# Patient Record
Sex: MALE | Race: WHITE | NOT HISPANIC OR LATINO | Employment: FULL TIME | URBAN - METROPOLITAN AREA
[De-identification: names, ages, dates, MRNs, and addresses within clinical notes are randomized per-mention and may not be internally consistent; named-entity substitution may affect disease eponyms.]

---

## 2017-01-31 ENCOUNTER — OFFICE VISIT (OUTPATIENT)
Dept: INTERNAL MEDICINE | Facility: CLINIC | Age: 67
End: 2017-01-31

## 2017-01-31 VITALS
WEIGHT: 218.8 LBS | BODY MASS INDEX: 32.41 KG/M2 | HEART RATE: 66 BPM | HEIGHT: 69 IN | SYSTOLIC BLOOD PRESSURE: 138 MMHG | TEMPERATURE: 97.4 F | DIASTOLIC BLOOD PRESSURE: 76 MMHG | RESPIRATION RATE: 16 BRPM

## 2017-01-31 DIAGNOSIS — E78.2 MIXED HYPERLIPIDEMIA: ICD-10-CM

## 2017-01-31 DIAGNOSIS — Z11.59 NEED FOR HEPATITIS C SCREENING TEST: ICD-10-CM

## 2017-01-31 DIAGNOSIS — Z00.00 ENCOUNTER FOR ANNUAL HEALTH EXAMINATION: Primary | ICD-10-CM

## 2017-01-31 DIAGNOSIS — N40.1 BENIGN NON-NODULAR PROSTATIC HYPERPLASIA WITH LOWER URINARY TRACT SYMPTOMS: ICD-10-CM

## 2017-01-31 DIAGNOSIS — I49.3 PVC'S (PREMATURE VENTRICULAR CONTRACTIONS): ICD-10-CM

## 2017-01-31 PROBLEM — E78.5 HYPERLIPIDEMIA: Status: ACTIVE | Noted: 2017-01-31

## 2017-01-31 PROBLEM — F32.A DEPRESSION: Status: ACTIVE | Noted: 2017-01-31

## 2017-01-31 PROBLEM — E78.00 HIGH CHOLESTEROL: Status: ACTIVE | Noted: 2017-01-31

## 2017-01-31 PROBLEM — R35.1 FREQUENT URINATION AT NIGHT: Status: ACTIVE | Noted: 2017-01-31

## 2017-01-31 PROBLEM — R00.2 PALPITATIONS: Status: ACTIVE | Noted: 2017-01-31

## 2017-01-31 PROCEDURE — G0008 ADMIN INFLUENZA VIRUS VAC: HCPCS | Performed by: INTERNAL MEDICINE

## 2017-01-31 PROCEDURE — 99204 OFFICE O/P NEW MOD 45 MIN: CPT | Performed by: INTERNAL MEDICINE

## 2017-01-31 RX ORDER — SILDENAFIL CITRATE 20 MG/1
20-100 TABLET ORAL DAILY PRN
Qty: 50 TABLET | Refills: 10 | Status: SHIPPED | OUTPATIENT
Start: 2017-01-31 | End: 2017-07-19

## 2017-01-31 RX ORDER — CHOLECALCIFEROL (VITAMIN D3) 125 MCG
500 CAPSULE ORAL DAILY
COMMUNITY
End: 2017-07-19

## 2017-01-31 NOTE — PROGRESS NOTES
Subjective   Alberto Sofia is a 66 y.o. male.     Chief Complaint   Patient presents with   • Palpitations     New Patient-Was recently in the ER for palpitations          Palpitations    This is a chronic problem. The current episode started more than 1 year ago. The problem occurs intermittently. The problem has been waxing and waning. On average, each episode lasts 1 second. Pertinent negatives include no anxiety, chest fullness, chest pain, coughing, diaphoresis, dizziness, shortness of breath or syncope. He has tried nothing for the symptoms. Risk factors include dyslipidemia, being male and family history.        The following portions of the patient's history were reviewed and updated as appropriate: allergies, current medications, past social history and problem list.    Outpatient Prescriptions Marked as Taking for the 1/31/17 encounter (Office Visit) with John Hines MD   Medication Sig Dispense Refill   • Multiple Vitamin (MULTI VITAMIN DAILY PO) Take  by mouth.     • vitamin B-12 (CYANOCOBALAMIN) 500 MCG tablet Take 500 mcg by mouth Daily.         Review of Systems   Constitutional: Negative for diaphoresis.   Respiratory: Negative for cough and shortness of breath.    Cardiovascular: Positive for palpitations. Negative for chest pain and syncope.   Neurological: Negative for dizziness.   Psychiatric/Behavioral: The patient is not nervous/anxious.        Objective   Vitals:    01/31/17 0925   BP: 138/76   Pulse: 66   Resp: 16   Temp: 97.4 °F (36.3 °C)      Last Weight    01/31/17  0925   Weight: 218 lb 12.8 oz (99.2 kg)    [unfilled]  Body mass index is 32.31 kg/(m^2).      Physical Exam   Constitutional: He appears well-developed and well-nourished. No distress.   HENT:   Head: Normocephalic and atraumatic.   Neck: Carotid bruit is not present. No thyromegaly present.   Cardiovascular: Normal rate, regular rhythm, normal heart sounds and intact distal pulses.  Exam reveals no gallop.    No  murmur heard.  Pulmonary/Chest: Effort normal and breath sounds normal. No respiratory distress. He has no wheezes. He has no rales.   Abdominal: Soft. Bowel sounds are normal. He exhibits no mass. There is no tenderness. There is no guarding.         Problem List Items Addressed This Visit        Cardiovascular and Mediastinum    PVC's (premature ventricular contractions)    Relevant Medications    sildenafil (REVATIO) 20 MG tablet    Hyperlipidemia    Relevant Orders    Lipid Panel       Genitourinary    Benign non-nodular prostatic hyperplasia with lower urinary tract symptoms    Relevant Orders    PSA      Other Visit Diagnoses     Encounter for annual health examination    -  Primary    Relevant Orders    CBC & Differential    Comprehensive Metabolic Panel    Need for hepatitis C screening test        Relevant Orders    Hepatitis C Antibody        Assessment/Plan   In with palpitations.  Was in the emergency room.  They just seem worse than usual.  He's had them for many years.  In the emergency room his exam was normal.  A 24 Holter was performed.  Had about 50 PVCs during that time as well as about 250 PACs.  These were all asymptomatic.  We discussed PVCs today.  Reassured.  No treatment is indicated.  He'll plan to schedule a physical at later date.  He does mention some problems with BPH and ED.  He's got a history of hyperlipidemia.  Only other coronary risk factor is a brother who had CAD in his 50s but was also a big-time smoker.  We'll begin sildenafil for his ED.  Schedule annual lab work today including CBC, CMP, lipids, PSA.  Schedule physical.  He knows Medicare does not cover physicals.  He is okay with that.  Flu shot and Prevnar today.         Dragon disclaimer:   Much of this encounter note is an electronic transcription/translation of spoken language to printed text. The electronic translation of spoken language may permit erroneous, or at times, nonsensical words or phrases to be  inadvertently transcribed; Although I have reviewed the note for such errors, some may still exist.

## 2017-01-31 NOTE — MR AVS SNAPSHOT
Alberto Sofia   1/31/2017 9:30 AM   Office Visit    Provider:  John Hines MD   Department:  [facility]   Dept Phone:  [phone]                Your Full Care Plan              Today's Medication Changes          These changes are accurate as of: 1/31/17 10:43 AM.  If you have any questions, ask your nurse or doctor.               New Medication(s)Ordered:     sildenafil 20 MG tablet   Commonly known as:  REVATIO   Take 1-5 tablets by mouth Daily As Needed (sex).   Started by:  John Hines MD            Where to Get Your Medications      You can get these medications from any pharmacy     Bring a paper prescription for each of these medications     sildenafil 20 MG tablet                  Your Updated Medication List          This list is accurate as of: 1/31/17 10:43 AM.  Always use your most recent med list.                MULTI VITAMIN DAILY PO       sildenafil 20 MG tablet   Commonly known as:  REVATIO   Take 1-5 tablets by mouth Daily As Needed (sex).       vitamin B-12 500 MCG tablet   Commonly known as:  CYANOCOBALAMIN               We Performed the Following     CBC & Differential     Comprehensive Metabolic Panel     Flu Vaccine Split Tri     Hepatitis C Antibody     Lipid Panel     PSA       You Were Diagnosed With        Codes Comments    Encounter for annual health examination    -  Primary ICD-10-CM: Z00.00  ICD-9-CM: V70.0     Mixed hyperlipidemia     ICD-10-CM: E78.2  ICD-9-CM: 272.2     PVC's (premature ventricular contractions)     ICD-10-CM: I49.3  ICD-9-CM: 427.69     Benign non-nodular prostatic hyperplasia with lower urinary tract symptoms     ICD-10-CM: N40.1  ICD-9-CM: 600.91     Need for hepatitis C screening test     ICD-10-CM: Z11.59  ICD-9-CM: V73.89       Instructions     None    Patient Instructions History      [employer] Signup     [facility] [employer] allows you to send messages to your doctor, view your test  "results, renew your prescriptions, schedule appointments, and more. To sign up, go to [email or web address] and click on the Sign Up Now link in the New User? box. Enter your [employer] Activation Code exactly as it appears below along with the last four digits of your Social Security Number and your Date of Birth () to complete the sign-up process. If you do not sign up before the expiration date, you must request a new code.    [employer] Activation Code: [email or web address]  Expires: 2017 10:40 AM    If you have questions, you can email [email or web address] or call [phone] to talk to our [employer] staff. Remember, [employer] is NOT to be used for urgent needs. For medical emergencies, dial 911.               Other Info from Your Visit           Your Appointments     Mar 17, 2017  9:30 AM EDT   Physical with John Hines MD   [facility] MEDICINE (--)    [address] [place] [id number] [phone]           Arrive 15 minutes prior to appointment.              Allergies     Iodine      Penicillins        Reason for Visit     Palpitations New Patient-Was recently in the ER for palpitations       Vital Signs     Blood Pressure Pulse Temperature Respirations Height Weight    138/76 (BP Location: Left arm, Patient Position: Sitting) 66 97.4 °F (36.3 °C) 16 69\" (175.3 cm) 218 lb 12.8 oz (99.2 kg)    Body Mass Index Smoking Status                32.31 kg/m2 Never Smoker          Problems and Diagnoses Noted     Benign non-nodular prostatic hyperplasia with lower urinary tract symptoms    Depression    High cholesterol or triglycerides    Irregular heartbeat    Encounter for annual health examination    -  Primary    Need for hepatitis C screening test          Immunizations Administered     Name Date    Influenza (IM) Preservative Free       "

## 2017-02-01 ENCOUNTER — TELEPHONE (OUTPATIENT)
Dept: INTERNAL MEDICINE | Facility: CLINIC | Age: 67
End: 2017-02-01

## 2017-02-01 LAB
ALBUMIN SERPL-MCNC: 5.2 G/DL (ref 3.5–5.2)
ALBUMIN/GLOB SERPL: 2.2 G/DL
ALP SERPL-CCNC: 90 U/L (ref 39–117)
ALT SERPL-CCNC: 33 U/L (ref 1–41)
AST SERPL-CCNC: 27 U/L (ref 1–40)
BASOPHILS # BLD AUTO: 0.03 10*3/MM3 (ref 0–0.2)
BASOPHILS NFR BLD AUTO: 0.6 % (ref 0–1.5)
BILIRUB SERPL-MCNC: 0.8 MG/DL (ref 0.1–1.2)
BUN SERPL-MCNC: 14 MG/DL (ref 8–23)
BUN/CREAT SERPL: 10.6 (ref 7–25)
CALCIUM SERPL-MCNC: 9.8 MG/DL (ref 8.6–10.5)
CHLORIDE SERPL-SCNC: 100 MMOL/L (ref 98–107)
CHOLEST SERPL-MCNC: 265 MG/DL (ref 0–200)
CO2 SERPL-SCNC: 26.3 MMOL/L (ref 22–29)
CREAT SERPL-MCNC: 1.32 MG/DL (ref 0.76–1.27)
EOSINOPHIL # BLD AUTO: 0.12 10*3/MM3 (ref 0–0.7)
EOSINOPHIL NFR BLD AUTO: 2.3 % (ref 0.3–6.2)
ERYTHROCYTE [DISTWIDTH] IN BLOOD BY AUTOMATED COUNT: 13.3 % (ref 11.5–14.5)
EST. AVERAGE GLUCOSE BLD GHB EST-MCNC: 121.48 MG/DL
GLOBULIN SER CALC-MCNC: 2.4 GM/DL
GLUCOSE SERPL-MCNC: 123 MG/DL (ref 65–99)
HBA1C MFR BLD: 5.86 % (ref 4.8–5.6)
HCT VFR BLD AUTO: 51.3 % (ref 40.4–52.2)
HCV AB S/CO SERPL IA: <0.1 S/CO RATIO (ref 0–0.9)
HDLC SERPL-MCNC: 39 MG/DL (ref 40–60)
HGB BLD-MCNC: 17.7 G/DL (ref 13.7–17.6)
IMM GRANULOCYTES # BLD: 0 10*3/MM3 (ref 0–0.03)
IMM GRANULOCYTES NFR BLD: 0 % (ref 0–0.5)
LDLC SERPL CALC-MCNC: ABNORMAL MG/DL
LYMPHOCYTES # BLD AUTO: 1.2 10*3/MM3 (ref 0.9–4.8)
LYMPHOCYTES NFR BLD AUTO: 23.1 % (ref 19.6–45.3)
Lab: NORMAL
MCH RBC QN AUTO: 32.4 PG (ref 27–32.7)
MCHC RBC AUTO-ENTMCNC: 34.5 G/DL (ref 32.6–36.4)
MCV RBC AUTO: 93.8 FL (ref 79.8–96.2)
MONOCYTES # BLD AUTO: 0.33 10*3/MM3 (ref 0.2–1.2)
MONOCYTES NFR BLD AUTO: 6.3 % (ref 5–12)
NEUTROPHILS # BLD AUTO: 3.52 10*3/MM3 (ref 1.9–8.1)
NEUTROPHILS NFR BLD AUTO: 67.7 % (ref 42.7–76)
PLATELET # BLD AUTO: 187 10*3/MM3 (ref 140–500)
POTASSIUM SERPL-SCNC: 4.7 MMOL/L (ref 3.5–5.2)
PROT SERPL-MCNC: 7.6 G/DL (ref 6–8.5)
PSA SERPL-MCNC: 2.16 NG/ML (ref 0–4)
RBC # BLD AUTO: 5.47 10*6/MM3 (ref 4.6–6)
SODIUM SERPL-SCNC: 141 MMOL/L (ref 136–145)
TRIGL SERPL-MCNC: 515 MG/DL (ref 0–150)
VLDLC SERPL CALC-MCNC: ABNORMAL MG/DL
WBC # BLD AUTO: 5.2 10*3/MM3 (ref 4.5–10.7)
WRITTEN AUTHORIZATION: NORMAL

## 2017-02-01 RX ORDER — ATORVASTATIN CALCIUM 40 MG/1
40 TABLET, FILM COATED ORAL DAILY
Qty: 90 TABLET | Refills: 1 | Status: SHIPPED | OUTPATIENT
Start: 2017-02-01 | End: 2017-12-21 | Stop reason: SDUPTHER

## 2017-02-01 NOTE — TELEPHONE ENCOUNTER
Patient would like a referral for a colonoscopy. Dr. Paige? Please advise.    Go [facility]head and make the referral.  Diagnosis routine screening colonoscopy.

## 2017-02-02 DIAGNOSIS — Z12.11 ENCOUNTER FOR SCREENING COLONOSCOPY: Primary | ICD-10-CM

## 2017-07-19 ENCOUNTER — TELEPHONE (OUTPATIENT)
Dept: INTERNAL MEDICINE | Facility: CLINIC | Age: 67
End: 2017-07-19

## 2017-07-19 ENCOUNTER — OFFICE VISIT (OUTPATIENT)
Dept: INTERNAL MEDICINE | Facility: CLINIC | Age: 67
End: 2017-07-19

## 2017-07-19 VITALS
RESPIRATION RATE: 16 BRPM | WEIGHT: 220.4 LBS | TEMPERATURE: 97.9 F | BODY MASS INDEX: 32.64 KG/M2 | HEART RATE: 60 BPM | SYSTOLIC BLOOD PRESSURE: 142 MMHG | OXYGEN SATURATION: 96 % | HEIGHT: 69 IN | DIASTOLIC BLOOD PRESSURE: 78 MMHG

## 2017-07-19 DIAGNOSIS — M54.5 RIGHT LOW BACK PAIN, UNSPECIFIED CHRONICITY, WITH SCIATICA PRESENCE UNSPECIFIED: Primary | ICD-10-CM

## 2017-07-19 LAB
BILIRUB BLD-MCNC: NEGATIVE MG/DL
CLARITY, POC: CLEAR
COLOR UR: YELLOW
GLUCOSE UR STRIP-MCNC: NEGATIVE MG/DL
KETONES UR QL: NEGATIVE
LEUKOCYTE EST, POC: NEGATIVE
NITRITE UR-MCNC: NEGATIVE MG/ML
PH UR: 5.5 [PH] (ref 5–8)
PROT UR STRIP-MCNC: ABNORMAL MG/DL
RBC # UR STRIP: NEGATIVE /UL
SP GR UR: 1.03 (ref 1–1.03)
UROBILINOGEN UR QL: NORMAL

## 2017-07-19 PROCEDURE — 81003 URINALYSIS AUTO W/O SCOPE: CPT | Performed by: INTERNAL MEDICINE

## 2017-07-19 PROCEDURE — 99213 OFFICE O/P EST LOW 20 MIN: CPT | Performed by: INTERNAL MEDICINE

## 2017-07-19 PROCEDURE — G0439 PPPS, SUBSEQ VISIT: HCPCS | Performed by: INTERNAL MEDICINE

## 2017-07-19 RX ORDER — PREDNISONE 1 MG/1
TABLET ORAL
Qty: 42 TABLET | Refills: 0 | Status: SHIPPED | OUTPATIENT
Start: 2017-07-19 | End: 2018-01-05

## 2017-07-19 RX ORDER — METHYLPREDNISOLONE 4 MG/1
TABLET ORAL
Qty: 1 EACH | Refills: 0 | Status: SHIPPED | OUTPATIENT
Start: 2017-07-19 | End: 2017-07-19

## 2017-07-19 NOTE — PROGRESS NOTES
Subjective   Alberto Sofia is a 67 y.o. male.     Chief Complaint   Patient presents with   • Back Pain         Back Pain   This is a new problem. The current episode started 1 to 4 weeks ago. The problem occurs constantly. The problem has been gradually worsening since onset. The quality of the pain is described as cramping. The pain does not radiate. The pain is severe. The symptoms are aggravated by bending and twisting. Pertinent negatives include no bladder incontinence, bowel incontinence, fever, numbness, paresis, paresthesias, tingling or weakness. He has tried nothing for the symptoms.        The following portions of the patient's history were reviewed and updated as appropriate: allergies, current medications, past social history and problem list.    Outpatient Prescriptions Marked as Taking for the 7/19/17 encounter (Office Visit) with John Hines MD   Medication Sig Dispense Refill   • atorvastatin (LIPITOR) 40 MG tablet Take 1 tablet by mouth Daily. 90 tablet 1   • Multiple Vitamin (MULTI VITAMIN DAILY PO) Take  by mouth.         Review of Systems   Constitutional: Negative for chills and fever.   Gastrointestinal: Negative for bowel incontinence.   Genitourinary: Negative for bladder incontinence and difficulty urinating.   Musculoskeletal: Positive for back pain.   Neurological: Negative for tingling, weakness, numbness and paresthesias.       Objective   Vitals:    07/19/17 1156   BP: 142/78   Pulse: 60   Resp: 16   Temp: 97.9 °F (36.6 °C)   SpO2: 96%      Last Weight    07/19/17  1156   Weight: 220 lb 6.4 oz (100 kg)    [unfilled]  Body mass index is 32.55 kg/(m^2).      Physical Exam   Constitutional: He is oriented to person, place, and time. He appears well-developed and well-nourished.   HENT:   Head: Normocephalic and atraumatic.   Neurological: He is alert and oriented to person, place, and time. He has normal strength and normal reflexes. He exhibits normal muscle tone.          Problem List Items Addressed This Visit     None      Visit Diagnoses     Right low back pain, unspecified chronicity, with sciatica presence unspecified    -  Primary    Relevant Orders    POCT urinalysis dipstick, automated (Completed)        Assessment/Plan   In with low back pain for over one week now.  It's a crampy pain.  It's about the level of L1.  He had one spell of pain in the midepigastric region that was brief and has not returned.  Is no radiation down the legs.  He's had a history of bulging disc in back and lower back.  A history of kidney stones.  The pain is worse with twisting and turning and movement.  It does not feel like a kidney stone to him nor does it sound like one to me.  This sounds like lumbar disc disease.  We'll treat with a Medrol Dosepak today.  Heat.  Rest.  Advil when necessary.  Urinalysis is normal today.  Annual wellness exam today.         Dragon disclaimer:   Much of this encounter note is an electronic transcription/translation of spoken language to printed text. The electronic translation of spoken language may permit erroneous, or at times, nonsensical words or phrases to be inadvertently transcribed; Although I have reviewed the note for such errors, some may still exist.

## 2017-07-19 NOTE — PROGRESS NOTES
QUICK REFERENCE INFORMATION:  The ABCs of the Annual Wellness Visit    Subsequent Medicare Wellness Visit    HEALTH RISK ASSESSMENT    1950    Recent Hospitalizations:  No hospitalization(s) within the last year..        Current Medical Providers:  Patient Care Team:  John Hines MD as PCP - Internal Medicine (Internal Medicine)  John Hines MD as PCP - Claims Attributed        Smoking Status:  History   Smoking Status   • Never Smoker   Smokeless Tobacco   • Not on file       Alcohol Consumption:  History   Alcohol Use No       Depression Screen:   PHQ-9 Depression Screening 7/19/2017   Little interest or pleasure in doing things 1   Feeling down, depressed, or hopeless 1   Trouble falling or staying asleep, or sleeping too much 0   Feeling tired or having little energy 1   Poor appetite or overeating 0   Feeling bad about yourself - or that you are a failure or have let yourself or your family down 1   Trouble concentrating on things, such as reading the newspaper or watching television 0   Moving or speaking so slowly that other people could have noticed. Or the opposite - being so fidgety or restless that you have been moving around a lot more than usual 0   Thoughts that you would be better off dead, or of hurting yourself in some way 0   PHQ-9 Total Score 4   If you checked off any problems, how difficult have these problems made it for you to do your work, take care of things at home, or get along with other people? Not difficult at all       Health Habits and Functional and Cognitive Screening:  Functional & Cognitive Status 7/19/2017   Do you have difficulty preparing food and eating? No   Do you have difficulty bathing yourself? No   Do you have difficulty getting dressed? No   Do you have difficulty using the toilet? No   Do you have difficulty moving around from place to place? No   In the past year have you fallen or experienced a near fall? No   Do you need help using the phone?  No   Are  you deaf or do you have serious difficulty hearing?  No   Do you need help with transportation? No   Do you need help shopping? No   Do you need help preparing meals?  No   Do you need help with housework?  No   Do you need help with laundry? No   Do you need help taking your medications? No   Do you need help managing money? No   Do you have difficulty concentrating, remembering or making decisions? No       Health Habits  Current Diet: Unhealthy Diet  Dental Exam: Up to date  Eye Exam: Up to date  Exercise (times per week): 4 times per week  Current Exercise Activities Include: Running/Jogging      Does the patient have evidence of cognitive impairment? No    Aspirin use counseling: Does not need ASA (and currently is not on it)      Recent Lab Results:  CMP:  Lab Results   Component Value Date     (H) 01/31/2017    BUN 14 01/31/2017    CREATININE 1.32 (H) 01/31/2017    EGFRIFNONA 54 (L) 01/31/2017    EGFRIFAFRI 66 01/31/2017    BCR 10.6 01/31/2017     01/31/2017    K 4.7 01/31/2017    CO2 26.3 01/31/2017    CALCIUM 9.8 01/31/2017    PROTENTOTREF 7.6 01/31/2017    ALBUMIN 5.20 01/31/2017    LABGLOBREF 2.4 01/31/2017    LABIL2 2.2 01/31/2017    BILITOT 0.8 01/31/2017    ALKPHOS 90 01/31/2017    AST 27 01/31/2017    ALT 33 01/31/2017     Lipid Panel:  Lab Results   Component Value Date    TRIG 515 (H) 01/31/2017    HDL 39 (L) 01/31/2017    VLDL CANCELED 01/31/2017     HbA1c:  Lab Results   Component Value Date    HGBA1C 5.86 (H) 01/31/2017       Visual Acuity:  No exam data present    Age-appropriate Screening Schedule:  Refer to the list below for future screening recommendations based on patient's age, sex and/or medical conditions. Orders for these recommended tests are listed in the plan section. The patient has been provided with a written plan.    Health Maintenance   Topic Date Due   • TDAP/TD VACCINES (1 - Tdap) 06/29/1968   • PNEUMOCOCCAL VACCINES (65+ LOW/MEDIUM RISK) (1 of 2 - PCV13)  "06/29/2014   • COLONOSCOPY  01/31/2017   • ZOSTER VACCINE  01/31/2017   • INFLUENZA VACCINE  08/01/2017   • LIPID PANEL  01/31/2018        Subjective   History of Present Illness    Alberto Sofia is a 67 y.o. male who presents for an Subsequent Wellness Visit.    The following portions of the patient's history were reviewed and updated as appropriate: allergies, current medications, past family history, past medical history, past social history, past surgical history and problem list.    Outpatient Medications Prior to Visit   Medication Sig Dispense Refill   • atorvastatin (LIPITOR) 40 MG tablet Take 1 tablet by mouth Daily. 90 tablet 1   • Multiple Vitamin (MULTI VITAMIN DAILY PO) Take  by mouth.     • sildenafil (REVATIO) 20 MG tablet Take 1-5 tablets by mouth Daily As Needed (sex). 50 tablet 10   • vitamin B-12 (CYANOCOBALAMIN) 500 MCG tablet Take 500 mcg by mouth Daily.       No facility-administered medications prior to visit.        Patient Active Problem List   Diagnosis   • PVC's (premature ventricular contractions)   • Benign non-nodular prostatic hyperplasia with lower urinary tract symptoms   • Depression   • Hyperlipidemia       Advance Care Planning:  has an advance directive - a copy HAS NOT been provided    Identification of Risk Factors:  Risk factors include: depression.    Review of Systems    Compared to one year ago, the patient feels his physical health is the same.  Compared to one year ago, the patient feels his mental health is the same.    Objective     Physical Exam    Vitals:    07/19/17 1156   BP: 142/78   BP Location: Left arm   Patient Position: Sitting   Pulse: 60   Resp: 16   Temp: 97.9 °F (36.6 °C)   SpO2: 96%   Weight: 220 lb 6.4 oz (100 kg)   Height: 69\" (175.3 cm)   PainSc:   8       Body mass index is 32.55 kg/(m^2).  Discussed the patient's BMI with him. The BMI is in the acceptable range; BMI management plan is completed.    Assessment/Plan   Patient Self-Management and " Personalized Health Advice  The patient has been provided with information about: depression and preventive services including:   · depression.    Visit Diagnoses:    ICD-10-CM ICD-9-CM   1. Right low back pain, unspecified chronicity, with sciatica presence unspecified M54.5 724.2       Orders Placed This Encounter   Procedures   • POCT urinalysis dipstick, automated       Outpatient Encounter Prescriptions as of 7/19/2017   Medication Sig Dispense Refill   • atorvastatin (LIPITOR) 40 MG tablet Take 1 tablet by mouth Daily. 90 tablet 1   • Multiple Vitamin (MULTI VITAMIN DAILY PO) Take  by mouth.     • MethylPREDNISolone (MEDROL, SAVANNAH,) 4 MG tablet Take as directed on package instructions. 1 each 0   • [DISCONTINUED] sildenafil (REVATIO) 20 MG tablet Take 1-5 tablets by mouth Daily As Needed (sex). 50 tablet 10   • [DISCONTINUED] vitamin B-12 (CYANOCOBALAMIN) 500 MCG tablet Take 500 mcg by mouth Daily.       No facility-administered encounter medications on file as of 7/19/2017.        Reviewed use of high risk medication in the elderly: yes  Reviewed for potential of harmful drug interactions in the elderly: yes    Follow Up:  Return for Recheck lipids, low back pain.     An After Visit Summary and PPPS with all of these plans were given to the patient.

## 2017-08-07 ENCOUNTER — TELEPHONE (OUTPATIENT)
Dept: INTERNAL MEDICINE | Facility: CLINIC | Age: 67
End: 2017-08-07

## 2017-08-07 DIAGNOSIS — M54.50 LOW BACK PAIN WITHOUT SCIATICA, UNSPECIFIED BACK PAIN LATERALITY, UNSPECIFIED CHRONICITY: Primary | ICD-10-CM

## 2017-08-07 NOTE — TELEPHONE ENCOUNTER
Patient was sen in the office on 7.19.2017 for lower back pain.  At that time you and the patient felt like this was muscle pain.  You prescribed him a medrol dosepak.  Patient states his pain is much worse and steroids did not help him.  Should he see an orthopaedic or chiropractor next?  Please advise.  No imaging was done.    Refer to Dr. Thompson.

## 2017-09-20 ENCOUNTER — TELEPHONE (OUTPATIENT)
Dept: GASTROENTEROLOGY | Facility: CLINIC | Age: 67
End: 2017-09-20

## 2017-09-20 NOTE — TELEPHONE ENCOUNTER
PATIENT CALLED DID OPEN ACCESS HEALTH HISTORY he DOES NOT WANT PAPER WORK MAILED. HE WILL BE IN SOON TO SCHEDULE

## 2017-10-03 ENCOUNTER — PREP FOR SURGERY (OUTPATIENT)
Dept: OTHER | Facility: HOSPITAL | Age: 67
End: 2017-10-03

## 2017-10-03 DIAGNOSIS — Z12.11 ENCOUNTER FOR SCREENING FOR MALIGNANT NEOPLASM OF COLON: Primary | ICD-10-CM

## 2017-10-03 RX ORDER — SODIUM CHLORIDE, SODIUM LACTATE, POTASSIUM CHLORIDE, CALCIUM CHLORIDE 600; 310; 30; 20 MG/100ML; MG/100ML; MG/100ML; MG/100ML
30 INJECTION, SOLUTION INTRAVENOUS CONTINUOUS
Status: CANCELLED | OUTPATIENT
Start: 2017-10-24

## 2017-10-04 PROBLEM — Z12.11 ENCOUNTER FOR SCREENING FOR MALIGNANT NEOPLASM OF COLON: Status: ACTIVE | Noted: 2017-10-04

## 2017-10-19 ENCOUNTER — TELEPHONE (OUTPATIENT)
Dept: INTERNAL MEDICINE | Facility: CLINIC | Age: 67
End: 2017-10-19

## 2017-10-19 ENCOUNTER — TELEPHONE (OUTPATIENT)
Dept: GASTROENTEROLOGY | Facility: CLINIC | Age: 67
End: 2017-10-19

## 2017-10-19 DIAGNOSIS — Z12.11 SCREENING FOR COLON CANCER: Primary | ICD-10-CM

## 2017-10-19 NOTE — TELEPHONE ENCOUNTER
Patient was going to have a routine colonoscopy, but has decided he would rather do Cologuard instead.  He wanted to make sure this is ok with you.  Please advise.    Yes.  Go ahead and order like a lab and it will come up.  Dx will be screen colon cancer.

## 2017-12-21 ENCOUNTER — OFFICE VISIT (OUTPATIENT)
Dept: INTERNAL MEDICINE | Facility: CLINIC | Age: 67
End: 2017-12-21

## 2017-12-21 VITALS
TEMPERATURE: 98.4 F | RESPIRATION RATE: 16 BRPM | HEIGHT: 69 IN | OXYGEN SATURATION: 97 % | WEIGHT: 213.6 LBS | HEART RATE: 68 BPM | DIASTOLIC BLOOD PRESSURE: 72 MMHG | SYSTOLIC BLOOD PRESSURE: 136 MMHG | BODY MASS INDEX: 31.64 KG/M2

## 2017-12-21 DIAGNOSIS — J40 BRONCHITIS: Primary | ICD-10-CM

## 2017-12-21 PROCEDURE — 99213 OFFICE O/P EST LOW 20 MIN: CPT | Performed by: INTERNAL MEDICINE

## 2017-12-21 RX ORDER — ATORVASTATIN CALCIUM 40 MG/1
40 TABLET, FILM COATED ORAL DAILY
Qty: 30 TABLET | Refills: 6 | Status: SHIPPED | OUTPATIENT
Start: 2017-12-21 | End: 2018-11-08 | Stop reason: SDUPTHER

## 2017-12-21 RX ORDER — ATORVASTATIN CALCIUM 40 MG/1
40 TABLET, FILM COATED ORAL DAILY
Qty: 30 TABLET | Refills: 6 | Status: SHIPPED | OUTPATIENT
Start: 2017-12-21 | End: 2017-12-21

## 2017-12-21 NOTE — PROGRESS NOTES
Subjective   Alberto Sofia is a 67 y.o. male.     Chief Complaint   Patient presents with   • Cough         Cough   This is a new problem. The current episode started 1 to 4 weeks ago. The problem has been unchanged. The cough is non-productive. Associated symptoms include chills, a fever and myalgias. Pertinent negatives include no chest pain, nasal congestion, postnasal drip, rhinorrhea, sore throat or shortness of breath. Nothing aggravates the symptoms. He has tried OTC cough suppressant for the symptoms. The treatment provided no relief. There is no history of asthma, COPD or emphysema.        The following portions of the patient's history were reviewed and updated as appropriate: allergies, current medications, past social history and problem list.    Outpatient Prescriptions Marked as Taking for the 12/21/17 encounter (Office Visit) with John Hines MD   Medication Sig Dispense Refill   • atorvastatin (LIPITOR) 40 MG tablet Take 1 tablet by mouth Daily. 30 tablet 6       Review of Systems   Constitutional: Positive for chills and fever.   HENT: Negative for postnasal drip, rhinorrhea and sore throat.    Respiratory: Positive for cough. Negative for shortness of breath.    Cardiovascular: Negative for chest pain.   Musculoskeletal: Positive for myalgias.       Objective   Vitals:    12/21/17 0917   BP: 136/72   Pulse: 68   Resp: 16   Temp: 98.4 °F (36.9 °C)   SpO2: 97%      Last Weight    12/21/17  0917   Weight: 96.9 kg (213 lb 9.6 oz)    [unfilled]  Body mass index is 31.53 kg/(m^2).      Physical Exam   Constitutional: He appears well-developed and well-nourished.   HENT:   Head: Normocephalic and atraumatic.   Right Ear: External ear normal.   Left Ear: External ear normal.   Nose: Nose normal.   Mouth/Throat: Oropharynx is clear and moist.   Eyes: Conjunctivae are normal. Pupils are equal, round, and reactive to light.   Pulmonary/Chest: Effort normal and breath sounds normal. No respiratory  distress. He has no wheezes. He has no rales.   Skin: Skin is warm and dry.         Problem List Items Addressed This Visit     None      Visit Diagnoses     Bronchitis    -  Primary        Assessment/Plan   In with 2 week illness.  Began with fever and chills and muscle aches.  Those are better.  He's developed a cough.  It persists.  Nonproductive.  Been taking Mucinex.  His exam is unremarkable.  I think this is a postviral cough.  He's not interested in suppressants.  We'll continue with Mucinex as needed.  He is also overdue for a variety of shots and will make sure he gets his Pneumovax, Prevnar, and TD AP over next several visits.  He did not take his flu shot this year.  Still an option when he gets better.         Dragon disclaimer:   Much of this encounter note is an electronic transcription/translation of spoken language to printed text. The electronic translation of spoken language may permit erroneous, or at times, nonsensical words or phrases to be inadvertently transcribed; Although I have reviewed the note for such errors, some may still exist.

## 2018-01-05 ENCOUNTER — OFFICE VISIT (OUTPATIENT)
Dept: INTERNAL MEDICINE | Facility: CLINIC | Age: 68
End: 2018-01-05

## 2018-01-05 VITALS
BODY MASS INDEX: 32.85 KG/M2 | HEART RATE: 67 BPM | WEIGHT: 221.8 LBS | SYSTOLIC BLOOD PRESSURE: 146 MMHG | RESPIRATION RATE: 16 BRPM | OXYGEN SATURATION: 95 % | DIASTOLIC BLOOD PRESSURE: 92 MMHG | HEIGHT: 69 IN | TEMPERATURE: 97.9 F

## 2018-01-05 DIAGNOSIS — E78.2 MIXED HYPERLIPIDEMIA: Primary | ICD-10-CM

## 2018-01-05 DIAGNOSIS — Z23 NEED FOR VACCINATION: ICD-10-CM

## 2018-01-05 DIAGNOSIS — Z23 NEED FOR IMMUNIZATION AGAINST INFLUENZA: ICD-10-CM

## 2018-01-05 DIAGNOSIS — I10 ESSENTIAL HYPERTENSION: ICD-10-CM

## 2018-01-05 PROCEDURE — 99213 OFFICE O/P EST LOW 20 MIN: CPT | Performed by: INTERNAL MEDICINE

## 2018-01-05 PROCEDURE — 90670 PCV13 VACCINE IM: CPT | Performed by: INTERNAL MEDICINE

## 2018-01-05 PROCEDURE — 90686 IIV4 VACC NO PRSV 0.5 ML IM: CPT | Performed by: INTERNAL MEDICINE

## 2018-01-05 PROCEDURE — G0008 ADMIN INFLUENZA VIRUS VAC: HCPCS | Performed by: INTERNAL MEDICINE

## 2018-01-05 PROCEDURE — G0009 ADMIN PNEUMOCOCCAL VACCINE: HCPCS | Performed by: INTERNAL MEDICINE

## 2018-01-05 NOTE — PATIENT INSTRUCTIONS
Pneumococcal Conjugate Vaccine (PCV13)   1. Why get vaccinated?  Vaccination can protect both children and adults from pneumococcal disease.  Pneumococcal disease is caused by bacteria that can spread from person to person through close contact. It can cause ear infections, and it can also lead to more serious infections of the:  · Lungs (pneumonia),  · Blood (bacteremia), and  · Covering of the brain and spinal cord (meningitis).  Pneumococcal pneumonia is most common among adults. Pneumococcal meningitis can cause deafness and brain damage, and it kills about 1 child in 10 who get it.  Anyone can get pneumococcal disease, but children under 2 years of age and adults 65 years and older, people with certain medical conditions, and cigarette smokers are at the highest risk.  Before there was a vaccine, the [place] saw:  · more than 700 cases of meningitis,  · about 13,000 blood infections,  · about 5 million ear infections, and  · about 200 deaths  in children under 5 each year from pneumococcal disease. Since vaccine became available, severe pneumococcal disease in these children has fallen by 88%.  About 18,000 older adults die of pneumococcal disease each year in the [place].  Treatment of pneumococcal infections with penicillin and other drugs is not as effective as it used to be, because some strains of the disease have become resistant to these drugs. This makes prevention of the disease, through vaccination, even more important.  2. PCV13 vaccine  Pneumococcal conjugate vaccine (called PCV13) protects against 13 types of pneumococcal bacteria.  PCV13 is routinely given to children at 2, 4, 6, and 12-15 months of age. It is also recommended for children and adults 2 to 64 years of age with certain health conditions, and for all adults 65 years of age and older. Your doctor can give you details.  3. Some people should not get this vaccine  Anyone who has ever had a life-threatening allergic reaction  to a dose of this vaccine, to an earlier pneumococcal vaccine called PCV7, or to any vaccine containing diphtheria toxoid (for example, DTaP), should not get PCV13.  Anyone with a severe allergy to any component of PCV13 should not get the vaccine. Tell your doctor if the person being vaccinated has any severe allergies.  If the person scheduled for vaccination is not feeling well, your healthcare provider might decide to reschedule the shot on another day.  4. Risks of a vaccine reaction  With any medicine, including vaccines, there is a chance of reactions. These are usually mild and go away on their own, but serious reactions are also possible.  Problems reported following PCV13 varied by age and dose in the series. The most common problems reported among children were:  · About half became drowsy after the shot, had a temporary loss of appetite, or had redness or tenderness where the shot was given.  · About 1 out of 3 had swelling where the shot was given.  · About 1 out of 3 had a mild fever, and about 1 in 20 had a fever over 102.2°F.  · Up to about 8 out of 10 became fussy or irritable.  Adults have reported pain, redness, and swelling where the shot was given; also mild fever, fatigue, headache, chills, or muscle pain.  Young children who get PCV13 along with inactivated flu vaccine at the same time may be at increased risk for seizures caused by fever. Ask your doctor for more information.  Problems that could happen after any vaccine:  · People sometimes faint after a medical procedure, including vaccination. Sitting or lying down for about 15 minutes can help prevent fainting, and injuries caused by a fall. Tell your doctor if you feel dizzy, or have vision changes or ringing in the ears.  · Some older children and adults get severe pain in the shoulder and have difficulty moving the arm where a shot was given. This happens very rarely.  · Any medication can cause a severe allergic reaction. Such  reactions from a vaccine are very rare, estimated at about 1 in a million doses, and would happen within a few minutes to a few hours after the vaccination.  As with any medicine, there is a very small chance of a vaccine causing a serious injury or death.  The safety of vaccines is always being monitored. For more information, visit: www.cdc.gov/vaccinesafety/  5. What if there is a serious reaction?  What should I look for?  · Look for anything that concerns you, such as signs of a severe allergic reaction, very high fever, or unusual behavior.  Signs of a severe allergic reaction can include hives, swelling of the face and throat, difficulty breathing, a fast heartbeat, dizziness, and weakness-usually within a few minutes to a few hours after the vaccination.  What should I do?  · If you think it is a severe allergic reaction or other emergency that can't wait, call 9-1-1 or get the person to the nearest hospital. Otherwise, call your doctor.  Reactions should be reported to the Vaccine Adverse Event Reporting System (VAERS). Your doctor should file this report, or you can do it yourself through the VAERS web site at www.vaers.[facility].gov, or by calling 1-937.544.5542.  VAERS does not give medical advice.  6. The National Vaccine Injury Compensation Program  The National Vaccine Injury Compensation Program (VICP) is a federal program that was created to compensate people who may have been injured by certain vaccines.  Persons who believe they may have been injured by a vaccine can learn about the program and about filing a claim by calling 1-322.245.9530 or visiting the VICP website at www.hrsa.gov/vaccinecompensation. There is a time limit to file a claim for compensation.  7. How can I learn more?  · Ask your healthcare provider. He or she can give you the vaccine package insert or suggest other sources of information.  · Call your local or state health department.  · Contact the Centers for Disease Control and  Prevention (CDC):    Call 1-675.113.6270 (1-800-CDC-INFO) or    Visit CDC's website at www.cdc.gov/vaccines  Vaccine Information Statement  PCV13 Vaccine (11/05/2015)     This information is not intended to replace advice given to you by your health care provider. Make sure you discuss any questions you have with your health care provider.     Document Released: 10/15/2007 Document Revised: 01/08/2016 Document Reviewed: 11/12/2015  [employer] Interactive Patient Education ©2017 [employer]  Pneumococcal Polysaccharide Vaccine: What You Need to Know  1. Why get vaccinated?  Vaccination can protect older adults (and some children and younger adults) from pneumococcal disease.  Pneumococcal disease is caused by bacteria that can spread from person to person through close contact. It can cause ear infections, and it can also lead to more serious infections of the:   · Lungs (pneumonia),  · Blood (bacteremia), and  · Covering of the brain and spinal cord (meningitis). Meningitis can cause deafness and brain damage, and it can be fatal.  Anyone can get pneumococcal disease, but children under 2 years of age, people with certain medical conditions, adults over 65 years of age, and cigarette smokers are at the highest risk.  About 18,000 older adults die each year from pneumococcal disease in the [place].  Treatment of pneumococcal infections with penicillin and other drugs used to be more effective. But some strains of the disease have become resistant to these drugs. This makes prevention of the disease, through vaccination, even more important.  2. Pneumococcal polysaccharide vaccine (PPSV23)  Pneumococcal polysaccharide vaccine (PPSV23) protects against 23 types of pneumococcal bacteria. It will not prevent all pneumococcal disease.  PPSV23 is recommended for:  · All adults 65 years of age and older,  · Anyone 2 through 64 years of age with certain long-term health problems,  · Anyone 2 through 64 years of age  with a weakened immune system,  · Adults 19 through 64 years of age who smoke cigarettes or have asthma.  Most people need only one dose of PPSV. A second dose is recommended for certain high-risk groups. People 65 and older should get a dose even if they have gotten one or more doses of the vaccine before they turned 65.  Your healthcare provider can give you more information about these recommendations.  Most healthy adults develop protection within 2 to 3 weeks of getting the shot.  3. Some people should not get this vaccine  · Anyone who has had a life-threatening allergic reaction to PPSV should not get another dose.  · Anyone who has a severe allergy to any component of PPSV should not receive it. Tell your provider if you have any severe allergies.  · Anyone who is moderately or severely ill when the shot is scheduled may be asked to wait until they recover before getting the vaccine. Someone with a mild illness can usually be vaccinated.  · Children less than 2 years of age should not receive this vaccine.  · There is no evidence that PPSV is harmful to either a pregnant woman or to her fetus. However, as a precaution, women who need the vaccine should be vaccinated before becoming pregnant, if possible.  4. Risks of a vaccine reaction  With any medicine, including vaccines, there is a chance of side effects. These are usually mild and go away on their own, but serious reactions are also possible.  About half of people who get PPSV have mild side effects, such as redness or pain where the shot is given, which go away within about two days.  Less than 1 out of 100 people develop a fever, muscle aches, or more severe local reactions.  Problems that could happen after any vaccine:  · People sometimes faint after a medical procedure, including vaccination. Sitting or lying down for about 15 minutes can help prevent fainting, and injuries caused by a fall. Tell your doctor if you feel dizzy, or have vision  changes or ringing in the ears.  · Some people get severe pain in the shoulder and have difficulty moving the arm where a shot was given. This happens very rarely.  · Any medication can cause a severe allergic reaction. Such reactions from a vaccine are very rare, estimated at about 1 in a million doses, and would happen within a few minutes to a few hours after the vaccination.  As with any medicine, there is a very remote chance of a vaccine causing a serious injury or death.  The safety of vaccines is always being monitored. For more information, visit: www.cdc.gov/vaccinesafety/  5. What if there is a serious reaction?  What should I look for?  Look for anything that concerns you, such as signs of a severe allergic reaction, very high fever, or unusual behavior.   Signs of a severe allergic reaction can include hives, swelling of the face and throat, difficulty breathing, a fast heartbeat, dizziness, and weakness. These would usually start a few minutes to a few hours after the vaccination.  What should I do?  If you think it is a severe allergic reaction or other emergency that can't wait, call 9-1-1 or get to the nearest hospital. Otherwise, call your doctor.  Afterward, the reaction should be reported to the Vaccine Adverse Event Reporting System (VAERS). Your doctor might file this report, or you can do it yourself through the VAERS web site at www.vaers.hhs.gov, or by calling 1-320.723.6407.   VAERS does not give medical advice.  6. How can I learn more?  · Ask your doctor. He or she can give you the vaccine package insert or suggest other sources of information.  · Call your local or state health department.  · Contact the Centers for Disease Control and Prevention (CDC):    Call 1-987.515.8174 (2-784-ZEL-INFO) or    Visit CDC's website at www.cdc.gov/vaccines  CDC Pneumococcal Polysaccharide Vaccine VIS (4/24/15)     This information is not intended to replace advice given to you by your health care  provider. Make sure you discuss any questions you have with your health care provider.     Document Released: 10/15/2007 Document Revised: 01/08/2016 Document Reviewed: 04/27/2015  [employer] Interactive Patient Education ©2017 [employer]  Influenza Virus Vaccine injection  What is this medicine?  INFLUENZA VIRUS VACCINE (in floo EN [facility] SEEN) helps to reduce the risk of getting influenza also known as the flu. The vaccine only helps protect you against some strains of the flu.  This medicine may be used for other purposes; ask your health care provider or pharmacist if you have questions.  COMMON BRAND NAME(S): Afluria, Agriflu, Alfuria, FLUAD, Fluarix, Fluarix Quadrivalent, Flublok, FLUCELVAX, Flulaval, Fluvirin, Fluzone, Fluzone High-Dose, Fluzone Intradermal  What should I tell my health care provider before I take this medicine?  They need to know if you have any of these conditions:  -bleeding disorder like hemophilia  -fever or infection  -Guillain-Belleville syndrome or other neurological problems  -immune system problems  -infection with the human immunodeficiency virus (HIV) or AIDS  -low blood platelet counts  -multiple sclerosis  -an unusual or allergic reaction to influenza virus vaccine, latex, other medicines, foods, dyes, or preservatives. Different brands of vaccines contain different allergens. Some may contain latex or eggs. Talk to your doctor about your allergies to make sure that you get the right vaccine.  -pregnant or trying to get pregnant  -breast-feeding  How should I use this medicine?  This vaccine is for injection into a muscle or under the skin. It is given by a health care professional.  A copy of Vaccine Information Statements will be given before each vaccination. Read this sheet carefully each time. The sheet may change frequently.  Talk to your healthcare provider to see which vaccines are right for you. Some vaccines should not be used in all age groups.  Overdosage: If  you think you have taken too much of this medicine contact a poison control center or emergency room at once.  NOTE: This medicine is only for you. Do not share this medicine with others.  What if I miss a dose?  This does not apply.  What may interact with this medicine?  -chemotherapy or radiation therapy  -medicines that lower your immune system like etanercept, anakinra, infliximab, and adalimumab  -medicines that treat or prevent blood clots like warfarin  -phenytoin  -steroid medicines like prednisone or cortisone  -theophylline  -vaccines  This list may not describe all possible interactions. Give your health care provider a list of all the medicines, herbs, non-prescription drugs, or dietary supplements you use. Also tell them if you smoke, drink alcohol, or use illegal drugs. Some items may interact with your medicine.  What should I watch for while using this medicine?  Report any side effects that do not go away within 3 days to your doctor or health care professional. Call your health care provider if any unusual symptoms occur within 6 weeks of receiving this vaccine.  You may still catch the flu, but the illness is not usually as bad. You cannot get the flu from the vaccine. The vaccine will not protect against colds or other illnesses that may cause fever. The vaccine is needed every year.  What side effects may I notice from receiving this medicine?  Side effects that you should report to your doctor or health care professional as soon as possible:  -allergic reactions like skin rash, itching or hives, swelling of the face, lips, or tongue  Side effects that usually do not require medical attention (report to your doctor or health care professional if they continue or are bothersome):  -fever  -headache  -muscle aches and pains  -pain, tenderness, redness, or swelling at the injection site  -tiredness  This list may not describe all possible side effects. Call your doctor for medical advice about  side effects. You may report side effects to FDA at 7-229-PEW-3046.  Where should I keep my medicine?  The vaccine will be given by a health care professional in a clinic, pharmacy, doctor's office, or other health care setting. You will not be given vaccine doses to store at home.  NOTE: This sheet is a summary. It may not cover all possible information. If you have questions about this medicine, talk to your doctor, pharmacist, or health care provider.     © 2017, Elsevier/[employer]. (2016-07-08 10:07:28)

## 2018-01-05 NOTE — PROGRESS NOTES
Subjective   Alberto Sofia is a 67 y.o. male.     Chief Complaint   Patient presents with   • Foot Injury     HARDENING OF ARTERY         Foot Injury    The incident occurred 5 to 7 days ago. [place] The injury mechanism was a fall. The pain is present in the right foot. The quality of the pain is described as aching. The pain is moderate. The pain has been constant since onset. Pertinent negatives include no inability to bear weight. The symptoms are aggravated by movement and weight bearing. He has tried NSAIDs for the symptoms.        The following portions of the patient's history were reviewed and updated as appropriate: allergies, current medications, past social history and problem list.    Outpatient Prescriptions Marked as Taking for the 1/5/18 encounter (Office Visit) with John Hines MD   Medication Sig Dispense Refill   • atorvastatin (LIPITOR) 40 MG tablet Take 1 tablet by mouth Daily. 30 tablet 6   • [DISCONTINUED] Multiple Vitamin (MULTI VITAMIN DAILY PO) Take  by mouth.         Review of Systems   Respiratory: Negative for shortness of breath.    Cardiovascular: Negative for chest pain and palpitations.       Objective   Vitals:    01/05/18 1002   BP: 146/92   Pulse: 67   Resp: 16   Temp: 97.9 °F (36.6 °C)   SpO2: 95%      Last Weight    01/05/18  1002   Weight: 101 kg (221 lb 12.8 oz)    [unfilled]  Body mass index is 32.74 kg/(m^2).      Physical Exam   Constitutional: He appears well-developed and well-nourished.   Cardiovascular: Normal rate, regular rhythm, normal heart sounds and intact distal pulses.  Exam reveals no friction rub.    No murmur heard.  Pulmonary/Chest: Effort normal and breath sounds normal. No respiratory distress. He has no wheezes. He has no rales.         Problem List Items Addressed This Visit        Cardiovascular and Mediastinum    Hyperlipidemia - Primary    Essential hypertension        Assessment/Plan   In today following a foot  injury 5 days ago.  He injured the right fifth MTP distally.  Saw a podiatrist and x-rays were performed.  They saw some vascular calcification.  He's completely asymptomatic from a cardiac or a carotid or peripheral vascular view.  Peripheral vascular exam and carotid exam and cardiac symptoms today is negative.  We had a nice lengthy discussion regarding the evaluation of asymptomatic disease.  There is really very little reason to do additional testing at this point.  The main value would be to be more aggressive with blood pressure and lipid control.  He did just start a statin within the last month.  Checking labs in a few months.  Blood pressure is borderline today and he is going to monitor it more carefully.  We also discussed the possibility of on demand testing of carotid and peripheral vascular systems and decided against it for now.  I think ultimately he is current and up on a ACE as well as a statin.  Also baby aspirin one per day.  He's given the hold off for now since he just had a recent nosebleed.  We also discussed discussed his immunizations.  After some refusal he finally decided to proceed with flu shot and Prevnar today.  He'll need Pneumovax in one year.  We also had a discussion regarding his refusal to take any of my advice on virtually any topic and whether we need to terminate our relationship today.  He's not ready to do that either.  We'll see how he behaves going forward.         Dragon disclaimer:   Much of this encounter note is an electronic transcription/translation of spoken language to printed text. The electronic translation of spoken language may permit erroneous, or at times, nonsensical words or phrases to be inadvertently transcribed; Although I have reviewed the note for such errors, some may still exist.

## 2018-02-01 ENCOUNTER — TELEPHONE (OUTPATIENT)
Dept: INTERNAL MEDICINE | Facility: CLINIC | Age: 68
End: 2018-02-01

## 2018-11-05 RX ORDER — ATORVASTATIN CALCIUM 40 MG/1
TABLET, FILM COATED ORAL
Qty: 30 TABLET | Refills: 5 | OUTPATIENT
Start: 2018-11-05

## 2018-11-08 RX ORDER — ATORVASTATIN CALCIUM 40 MG/1
40 TABLET, FILM COATED ORAL DAILY
Qty: 30 TABLET | Refills: 0 | Status: SHIPPED | OUTPATIENT
Start: 2018-11-08

## 2018-11-08 NOTE — TELEPHONE ENCOUNTER
We denied refill request from 11-5-18 due to patient being over due for an appt. Patient was not aware he was on a 3 mo call. He was due back in April. Informed patient we can give him a 30 day to hold him over until he can make it in for appt. Patient was not interested in scheduling at this time. Said he would back.

## 2019-01-14 RX ORDER — ATORVASTATIN CALCIUM 40 MG/1
TABLET, FILM COATED ORAL
Qty: 30 TABLET | Refills: 5 | OUTPATIENT
Start: 2019-01-14

## 2019-01-28 RX ORDER — ATORVASTATIN CALCIUM 40 MG/1
TABLET, FILM COATED ORAL
Qty: 30 TABLET | Refills: 5 | OUTPATIENT
Start: 2019-01-28

## 2023-11-28 ENCOUNTER — HOSPITAL ENCOUNTER (OUTPATIENT)
Facility: HOSPITAL | Age: 73
Discharge: HOME | End: 2023-11-28
Attending: EMERGENCY MEDICINE | Admitting: EMERGENCY MEDICINE
Payer: MEDICARE

## 2023-11-28 VITALS
WEIGHT: 220 LBS | OXYGEN SATURATION: 95 % | HEIGHT: 70 IN | HEART RATE: 82 BPM | BODY MASS INDEX: 31.5 KG/M2 | SYSTOLIC BLOOD PRESSURE: 143 MMHG | RESPIRATION RATE: 16 BRPM | TEMPERATURE: 97.5 F | DIASTOLIC BLOOD PRESSURE: 100 MMHG

## 2023-11-28 DIAGNOSIS — J02.9 PHARYNGITIS, UNSPECIFIED ETIOLOGY: Primary | ICD-10-CM

## 2023-11-28 LAB
FLUAV SUBTYP SPEC NAA+PROBE: NOT DETECTED
FLUBV RNA ISLT QL NAA+PROBE: NOT DETECTED
SARS-COV-2 RNA RESP QL NAA+PROBE: NOT DETECTED
STREP A PCR: NOT DETECTED

## 2023-11-28 PROCEDURE — G0463 HOSPITAL OUTPT CLINIC VISIT: HCPCS | Performed by: NURSE PRACTITIONER

## 2023-11-28 PROCEDURE — 87636 SARSCOV2 & INF A&B AMP PRB: CPT | Performed by: NURSE PRACTITIONER

## 2023-11-28 PROCEDURE — 87651 STREP A DNA AMP PROBE: CPT | Performed by: NURSE PRACTITIONER

## 2023-11-28 RX ORDER — CEFDINIR 300 MG/1
300 CAPSULE ORAL 2 TIMES DAILY
Qty: 14 CAPSULE | Refills: 0 | Status: SHIPPED | OUTPATIENT
Start: 2023-11-28

## 2023-11-28 NOTE — FSED PROVIDER NOTE
Subjective   History of Present Illness  Patient is 73-year-old male who presents complaining of sore throat after being around his grandkids over the holiday who later tested positive for strep.  He denies any fever, chills.      Review of Systems   HENT:  Positive for sore throat.    All other systems reviewed and are negative.      Past Medical History:   Diagnosis Date    Depression     Frequent urination at night     Hyperlipidemia     Kidney stone     Palpitations        Allergies   Allergen Reactions    Iodine     Penicillins     Shellfish-Derived Products Swelling       Past Surgical History:   Procedure Laterality Date    APPENDECTOMY      CATARACT EXTRACTION         Family History   Problem Relation Age of Onset    Hearing loss Father     Prostate cancer Father     Heart attack Brother     Drug abuse Son     Brain cancer Brother     Lung cancer Brother        Social History     Socioeconomic History    Marital status: Single   Tobacco Use    Smoking status: Never    Smokeless tobacco: Never   Substance and Sexual Activity    Alcohol use: No    Drug use: No           Objective   Physical Exam  Vitals and nursing note reviewed.   Constitutional:       Appearance: He is well-developed.   HENT:      Head: Normocephalic and atraumatic.      Mouth/Throat:      Mouth: Mucous membranes are moist.      Pharynx: Pharyngeal swelling and posterior oropharyngeal erythema present.   Cardiovascular:      Rate and Rhythm: Normal rate and regular rhythm.   Pulmonary:      Effort: Pulmonary effort is normal.      Breath sounds: Normal breath sounds.   Musculoskeletal:      Cervical back: Normal range of motion and neck supple.   Skin:     General: Skin is warm and dry.      Capillary Refill: Capillary refill takes less than 2 seconds.   Neurological:      General: No focal deficit present.      Mental Status: He is alert and oriented to person, place, and time.         Procedures           ED Course                                           [facility]([id number])@                  Medical Decision Making  Patient is negative for strep, COVID, flu but will treat given that he has had recent positive exposure and is now symptomatic.  He is otherwise nontoxic and was given strict return precautions.    Problems Addressed:  Pharyngitis, unspecified etiology: complicated acute illness or injury    Risk  Prescription drug management.        Final diagnoses:   Pharyngitis, unspecified etiology       ED Disposition  ED Disposition       ED Disposition   Discharge    Condition   Stable    Comment   --               Trinity Ac, APRN  [address] [address]  [phone]    Call   If symptoms worsen         Medication List        New Prescriptions      cefdinir 300 MG capsule  Commonly known as: OMNICEF  Take 1 capsule by mouth 2 (Two) Times a Day.               Where to Get Your Medications        These medications were sent to [id number] - [place], [place] - [address] - [phone]  - [phone]   [address], [place] [address]      Phone: [phone]   cefdinir 300 MG capsule

## 2024-02-01 ENCOUNTER — HOSPITAL ENCOUNTER (OUTPATIENT)
Facility: HOSPITAL | Age: 74
Discharge: HOME | End: 2024-02-01
Attending: EMERGENCY MEDICINE | Admitting: EMERGENCY MEDICINE
Payer: MEDICARE

## 2024-02-01 VITALS
BODY MASS INDEX: 31.5 KG/M2 | DIASTOLIC BLOOD PRESSURE: 90 MMHG | WEIGHT: 220 LBS | RESPIRATION RATE: 18 BRPM | TEMPERATURE: 98 F | OXYGEN SATURATION: 94 % | HEART RATE: 61 BPM | SYSTOLIC BLOOD PRESSURE: 150 MMHG | HEIGHT: 70 IN

## 2024-02-01 DIAGNOSIS — J32.1 FRONTAL SINUSITIS, UNSPECIFIED CHRONICITY: Primary | ICD-10-CM

## 2024-02-01 PROCEDURE — 87651 STREP A DNA AMP PROBE: CPT | Performed by: EMERGENCY MEDICINE

## 2024-02-01 PROCEDURE — 99213 OFFICE O/P EST LOW 20 MIN: CPT | Performed by: PHYSICIAN ASSISTANT

## 2024-02-01 PROCEDURE — G0463 HOSPITAL OUTPT CLINIC VISIT: HCPCS | Performed by: PHYSICIAN ASSISTANT

## 2024-02-01 PROCEDURE — 87636 SARSCOV2 & INF A&B AMP PRB: CPT | Performed by: EMERGENCY MEDICINE

## 2024-02-01 RX ORDER — DOXYCYCLINE 100 MG/1
100 CAPSULE ORAL 2 TIMES DAILY
Qty: 20 CAPSULE | Refills: 0 | Status: SHIPPED | OUTPATIENT
Start: 2024-02-01 | End: 2024-02-11

## 2024-02-01 RX ORDER — PREDNISONE 20 MG/1
TABLET ORAL
Qty: 9 TABLET | Refills: 0 | Status: SHIPPED | OUTPATIENT
Start: 2024-02-01 | End: 2024-02-06

## 2024-02-01 NOTE — FSED PROVIDER NOTE
Subjective   History of Present Illness  Patient is a 73-year-old gentleman with a history of high cholesterol presents emergency room with URI symptoms that been going on about 10 days.  He has had sinusitis, sore throat.  He says sometimes he gets dizzy when he turns his head.  He says this is not vertigo and there is not a spinning sensation, been but it is an indescribable feeling with sudden head movements.      Review of Systems   Constitutional:  Negative for fever.   HENT:  Positive for congestion, sinus pain and sore throat.    Musculoskeletal:  Positive for neck stiffness (and pain).   Neurological:  Positive for dizziness and headaches. Negative for weakness.   Psychiatric/Behavioral: Negative.     All other systems reviewed and are negative.      Past Medical History:   Diagnosis Date    Depression     Frequent urination at night     Hyperlipidemia     Kidney stone     Palpitations        Allergies   Allergen Reactions    Iodine     Penicillins     Shellfish-Derived Products Swelling       Past Surgical History:   Procedure Laterality Date    APPENDECTOMY      CATARACT EXTRACTION         Family History   Problem Relation Age of Onset    Hearing loss Father     Prostate cancer Father     Heart attack Brother     Drug abuse Son     Brain cancer Brother     Lung cancer Brother        Social History     Socioeconomic History    Marital status: Single   Tobacco Use    Smoking status: Never    Smokeless tobacco: Never   Substance and Sexual Activity    Alcohol use: No    Drug use: No           Objective   Physical Exam  Vitals reviewed.   Constitutional:       Appearance: He is well-developed.   HENT:      Right Ear: No drainage, swelling or tenderness. A middle ear effusion is present. Tympanic membrane is not erythematous.      Left Ear: No drainage, swelling or tenderness. A middle ear effusion is present. Tympanic membrane is not erythematous.      Mouth/Throat:      Pharynx: Posterior oropharyngeal  erythema present. No pharyngeal swelling or uvula swelling.      Tonsils: No tonsillar exudate or tonsillar abscesses.   Cardiovascular:      Heart sounds: Normal heart sounds.   Pulmonary:      Effort: Pulmonary effort is normal.      Breath sounds: Normal breath sounds.   Abdominal:      General: There is distension.   Musculoskeletal:      Cervical back: Normal range of motion and neck supple.   Skin:     General: Skin is warm and dry.      Capillary Refill: Capillary refill takes less than 2 seconds.   Neurological:      General: No focal deficit present.      Mental Status: He is alert.   Psychiatric:         Mood and Affect: Mood normal.         Behavior: Behavior normal.         Procedures           ED Course                                           Medical Decision Making  Patient is negative for COVID, flu and strep.  He does describe a lightheadedness dizziness that will sometimes happen with sudden head movements and symptoms started about a week and a half ago.  He has a history of high cholesterol.  I talked to him he says it is not true vertigo.  We did talk about a larger workup for the dizziness and patient declined.  Symptoms going on for about 10 days and he says if he opts have a larger workout to rule out CVA he will go to [place] since that is where his primary doctors are.  I have low suspicion for CVA as patient's symptoms come with sinus congestion, ear fullness, sore throat, green mucus. As said above, he wants to be swabbed for COVID, flu and strep which are negative.  He had symptoms about 10 days of sinus pain and pressure.  He is allergic to penicillins will start on doxycycline.  Also start him on some prednisone.  He says he has some borderline diabetes but is not on medication for it his medication list says metformin he said he has not taken that in years and his doctor has not prescribed it.  He walks without difficulty and does not appear to be vertiginous, I do not notice any  neurofocal deficit.  At this time he is medically cleared he is return to the emergency room as needed.    Problems Addressed:  Frontal sinusitis, unspecified chronicity: complicated acute illness or injury    Risk  Prescription drug management.        Final diagnoses:   Frontal sinusitis, unspecified chronicity       ED Disposition  ED Disposition       ED Disposition   Discharge    Condition   Stable    Comment   --               Trinity Ac, APRN  [address]  [facility] [address]  [facility] [id number] [phone]    In 1 week           Medication List        New Prescriptions      doxycycline 100 MG capsule  Commonly known as: MONODOX  Take 1 capsule by mouth 2 (Two) Times a Day for 10 days.     predniSONE 20 MG tablet  Commonly known as: DELTASONE  Take 2 tablets by mouth Daily for 3 days, THEN 1 tablet Daily for 3 days.  Start taking on: February 1, 2024            Stop      benzonatate 100 MG capsule  Commonly known as: TESSALON     cefdinir 300 MG capsule  Commonly known as: OMNICEF     guaiFENesin 600 MG 12 hr tablet  Commonly known as: MUCINEX               Where to Get Your Medications        These medications were sent to [id number] - [place], [place] - [address] [phone]  - [phone]   [address] [address]      Phone: [phone]   doxycycline 100 MG capsule  predniSONE 20 MG tablet

## 2024-06-07 ENCOUNTER — HOSPITAL ENCOUNTER (OUTPATIENT)
Facility: HOSPITAL | Age: 74
Discharge: HOME | End: 2024-06-07
Attending: EMERGENCY MEDICINE | Admitting: EMERGENCY MEDICINE
Payer: MEDICARE

## 2024-06-07 ENCOUNTER — APPOINTMENT (OUTPATIENT)
Dept: GENERAL RADIOLOGY | Facility: HOSPITAL | Age: 74
End: 2024-06-07
Payer: MEDICARE

## 2024-06-07 VITALS
OXYGEN SATURATION: 95 % | BODY MASS INDEX: 31.5 KG/M2 | DIASTOLIC BLOOD PRESSURE: 80 MMHG | HEIGHT: 70 IN | HEART RATE: 85 BPM | TEMPERATURE: 98.4 F | SYSTOLIC BLOOD PRESSURE: 148 MMHG | WEIGHT: 220 LBS | RESPIRATION RATE: 18 BRPM

## 2024-06-07 DIAGNOSIS — J06.9 UPPER RESPIRATORY TRACT INFECTION, UNSPECIFIED TYPE: Primary | ICD-10-CM

## 2024-06-07 LAB
FLUAV SUBTYP SPEC NAA+PROBE: NOT DETECTED
FLUBV RNA ISLT QL NAA+PROBE: NOT DETECTED
SARS-COV-2 RNA RESP QL NAA+PROBE: NOT DETECTED

## 2024-06-07 PROCEDURE — 71045 X-RAY EXAM CHEST 1 VIEW: CPT

## 2024-06-07 PROCEDURE — G0463 HOSPITAL OUTPT CLINIC VISIT: HCPCS

## 2024-06-07 PROCEDURE — 87636 SARSCOV2 & INF A&B AMP PRB: CPT

## 2024-06-07 PROCEDURE — 99213 OFFICE O/P EST LOW 20 MIN: CPT

## 2024-06-07 RX ORDER — DOXYCYCLINE 100 MG/1
100 CAPSULE ORAL 2 TIMES DAILY
Qty: 14 CAPSULE | Refills: 0 | Status: SHIPPED | OUTPATIENT
Start: 2024-06-07 | End: 2024-06-14

## 2024-06-07 RX ORDER — DOXYCYCLINE 100 MG/1
100 CAPSULE ORAL ONCE
Status: COMPLETED | OUTPATIENT
Start: 2024-06-07 | End: 2024-06-07

## 2024-06-07 RX ADMIN — DOXYCYCLINE 100 MG: 100 CAPSULE ORAL at 18:40

## 2024-06-07 NOTE — FSED PROVIDER NOTE
Subjective   History of Present Illness  Patient is a 73-year-old male who presents to the emergency department for upper respiratory symptoms x 2 days.  Patient reports congested cough, fatigue, runny nose, headache, body aches, chills, hoarse voice.  No known fever.  Denies shortness of breath and chest pain.        Review of Systems   Constitutional:  Positive for chills and fatigue. Negative for diaphoresis and fever.   HENT:  Positive for congestion, rhinorrhea, sore throat and voice change. Negative for ear discharge and ear pain.    Eyes:  Negative for pain and redness.   Respiratory:  Positive for cough. Negative for shortness of breath, wheezing and stridor.    Cardiovascular:  Negative for chest pain.   Gastrointestinal:  Negative for abdominal pain, nausea and vomiting.   Genitourinary:  Negative for difficulty urinating and dysuria.   Skin:  Negative for color change, pallor, rash and wound.   Neurological:  Positive for headaches. Negative for dizziness, syncope and weakness.       Past Medical History:   Diagnosis Date    Depression     Frequent urination at night     Hyperlipidemia     Kidney stone     Palpitations        Allergies   Allergen Reactions    Iodine     Penicillins     Shellfish-Derived Products Swelling       Past Surgical History:   Procedure Laterality Date    APPENDECTOMY      CATARACT EXTRACTION         Family History   Problem Relation Age of Onset    Hearing loss Father     Prostate cancer Father     Heart attack Brother     Drug abuse Son     Brain cancer Brother     Lung cancer Brother        Social History     Socioeconomic History    Marital status: Single   Tobacco Use    Smoking status: Never    Smokeless tobacco: Never   Substance and Sexual Activity    Alcohol use: No    Drug use: No           Objective   Physical Exam  Constitutional:       General: He is not in acute distress.     Appearance: He is normal weight. He is ill-appearing. He is not toxic-appearing or  diaphoretic.   HENT:      Right Ear: Tympanic membrane, ear canal and external ear normal.      Left Ear: Tympanic membrane, ear canal and external ear normal.      Nose: Congestion present.      Mouth/Throat:      Mouth: Mucous membranes are moist.      Pharynx: Oropharynx is clear. No oropharyngeal exudate or posterior oropharyngeal erythema.   Eyes:      Extraocular Movements: Extraocular movements intact.      Conjunctiva/sclera: Conjunctivae normal.      Pupils: Pupils are equal, round, and reactive to light.   Cardiovascular:      Rate and Rhythm: Normal rate and regular rhythm.   Pulmonary:      Effort: Pulmonary effort is normal. No respiratory distress.      Breath sounds: Normal breath sounds. No stridor. No wheezing, rhonchi or rales.      Comments: Occasional cough.  Chest:      Chest wall: No tenderness.   Musculoskeletal:         General: Normal range of motion.   Skin:     General: Skin is warm and dry.   Neurological:      General: No focal deficit present.      Mental Status: He is alert.      Motor: No weakness.      Gait: Gait normal.   Psychiatric:         Mood and Affect: Mood normal.         Behavior: Behavior normal.         Procedures           ED Course  ED Course as of 06/07/24 1858 Fri Jun 07, 2024   1825 COVID19: Not Detected [AS]   1825 Influenza A PCR: Not Detected [AS]   1849 Chest x-ray 1 view:  CLINICAL HISTORY: Upper respiratory tract infection with cough and  congestion for several days     This is correlated to a prior PA and lateral chest x-ray on 11/15/2016.     FINDINGS: The cardiomediastinal silhouette and the pulmonary vasculature  are within normal limits. The lungs are clear. The costophrenic angles  are sharp.     IMPRESSION:  1. No active disease is seen in the chest with no change when compared  to the patient's prior chest x-ray on 11/15/2016. The etiology of this  patient's cough and congestion for the last few days is not established  on this exam.   [AS]      ED  Course User Index  [AS] Clarissa Ross PA-C                                           Medical Decision Making  Patient is a 73-year-old male presents for upper respiratory symptoms.  Overall appears ill, no acute distress and nontoxic.  Vital signs WNL.  Otic, oropharyngeal, pulmonary exams are unremarkable.  Chest x-ray is negative.  Due to patient's age and symptoms, will cover with antibiotic for possible sinusitis.  Recommend continued symptomatic measures as well.  Discussed when to return to the emergency department.  Answered all questions.  Patient verbalized understanding and was agreeable to plan and discharge.    My differential diagnosis for cough includes but is not limited to:  Upper respiratory infection, bronchitis, pneumonia, COPD exacerbation, cough variant asthma, cardiac asthma, coronary artery disease, congestive heart failure, bacterial, viral or lung infections, lung cancer, aspiration pneumonitis, aspiration of foreign body and Covid -19        Problems Addressed:  Upper respiratory tract infection, unspecified type: complicated acute illness or injury    Amount and/or Complexity of Data Reviewed  Labs:  Decision-making details documented in ED Course.  Radiology: ordered.    Risk  Prescription drug management.        Final diagnoses:   Upper respiratory tract infection, unspecified type       ED Disposition  ED Disposition       ED Disposition   Discharge    Condition   Stable    Comment   --               Trinity Ac, APRN  [address] [id number] [phone]               Medication List        New Prescriptions      doxycycline 100 MG capsule  Commonly known as: MONODOX  Take 1 capsule by mouth 2 (Two) Times a Day for 7 days.               Where to Get Your Medications        These medications were sent to [id number] - [place], [place] - [address] - [phone]  - [phone]  [address], [facility] [address]      Phone:  [phone]   doxycycline 100 MG capsule

## 2024-06-07 NOTE — DISCHARGE INSTRUCTIONS
Your chest x-ray did not show any signs of pneumonia.  Take the prescribed antibiotic medicine you are given as directed until it is gone.  First dose given in ER tonight.  Next dose due tomorrow morning.  Not taking all the medicine can make future infections hard to treat.  Return to emergency department for worsening symptoms or other medical emergencies.  Recommended follow-up with PCP.  Refer to the attached instructions for further information.

## 2024-07-01 ENCOUNTER — APPOINTMENT (OUTPATIENT)
Dept: GENERAL RADIOLOGY | Facility: HOSPITAL | Age: 74
End: 2024-07-01
Payer: MEDICARE

## 2024-07-01 ENCOUNTER — HOSPITAL ENCOUNTER (OUTPATIENT)
Facility: HOSPITAL | Age: 74
Discharge: HOME | End: 2024-07-01
Attending: EMERGENCY MEDICINE | Admitting: EMERGENCY MEDICINE
Payer: MEDICARE

## 2024-07-01 VITALS
TEMPERATURE: 98.4 F | BODY MASS INDEX: 31.5 KG/M2 | HEIGHT: 70 IN | WEIGHT: 220 LBS | HEART RATE: 85 BPM | RESPIRATION RATE: 18 BRPM | OXYGEN SATURATION: 94 % | SYSTOLIC BLOOD PRESSURE: 144 MMHG | DIASTOLIC BLOOD PRESSURE: 90 MMHG

## 2024-07-01 DIAGNOSIS — J06.9 UPPER RESPIRATORY TRACT INFECTION, UNSPECIFIED TYPE: Primary | ICD-10-CM

## 2024-07-01 PROCEDURE — 63710000001 PREDNISONE PER 5 MG: Performed by: EMERGENCY MEDICINE

## 2024-07-01 PROCEDURE — G0463 HOSPITAL OUTPT CLINIC VISIT: HCPCS | Performed by: EMERGENCY MEDICINE

## 2024-07-01 PROCEDURE — 71045 X-RAY EXAM CHEST 1 VIEW: CPT

## 2024-07-01 RX ORDER — AZITHROMYCIN 250 MG/1
500 TABLET, FILM COATED ORAL ONCE
Status: COMPLETED | OUTPATIENT
Start: 2024-07-01 | End: 2024-07-01

## 2024-07-01 RX ORDER — AZITHROMYCIN 250 MG/1
TABLET, FILM COATED ORAL
Qty: 4 TABLET | Refills: 0 | Status: SHIPPED | OUTPATIENT
Start: 2024-07-01

## 2024-07-01 RX ORDER — PREDNISONE 20 MG/1
TABLET ORAL
Qty: 9 TABLET | Refills: 0 | Status: SHIPPED | OUTPATIENT
Start: 2024-07-01

## 2024-07-01 RX ORDER — DEXTROMETHORPHAN HYDROBROMIDE AND PROMETHAZINE HYDROCHLORIDE 15; 6.25 MG/5ML; MG/5ML
5 SYRUP ORAL 4 TIMES DAILY PRN
Qty: 120 ML | Refills: 0 | Status: SHIPPED | OUTPATIENT
Start: 2024-07-01 | End: 2024-07-07

## 2024-07-01 RX ADMIN — PREDNISONE 60 MG: 10 TABLET ORAL at 10:20

## 2024-07-01 RX ADMIN — AZITHROMYCIN 500 MG: 250 TABLET, FILM COATED ORAL at 10:20

## 2024-07-01 NOTE — FSED PROVIDER NOTE
EMERGENCY DEPARTMENT ENCOUNTER    Room Number:  09/09  Date seen:  7/1/2024  Time seen: 10:17 EDT  PCP: Trinity Ac APRN  Historian:     Discussed/obtained information from independent historians:     HPI:    A complete HPI/ROS/PMH/PSH/SH/FH are unobtainable due to:   Context: Patient is a 74-year-old male.  He presents with a approximate 3-week history of nasal congestion, cough.  He was treated with a 7-day course of doxycycline that was prescribed here last month.  No improvement with this.  He states he has had increasing cough and postnasal drainage the last several days.  No fever no chills.  No sick contacts.  No shortness of breath at this time    External (non-ED) record review:      Chronic or social conditions impacting care:    ALLERGIES  Iodine, Penicillins, and Shellfish-derived products    PAST MEDICAL HISTORY  Active Ambulatory Problems     Diagnosis Date Noted    PVC's (premature ventricular contractions) 01/31/2017    Benign non-nodular prostatic hyperplasia with lower urinary tract symptoms 01/31/2017    Depression 01/31/2017    Hyperlipidemia 01/31/2017    Essential hypertension 01/05/2018     Resolved Ambulatory Problems     Diagnosis Date Noted    No Resolved Ambulatory Problems     Past Medical History:   Diagnosis Date    Frequent urination at night     Kidney stone     Palpitations        PAST SURGICAL HISTORY  Past Surgical History:   Procedure Laterality Date    APPENDECTOMY      CATARACT EXTRACTION         FAMILY HISTORY  Family History   Problem Relation Age of Onset    Hearing loss Father     Prostate cancer Father     Heart attack Brother     Drug abuse Son     Brain cancer Brother     Lung cancer Brother        SOCIAL HISTORY  Social History     Socioeconomic History    Marital status: Single   Tobacco Use    Smoking status: Never    Smokeless tobacco: Never   Substance and Sexual Activity    Alcohol use: No    Drug use: No       REVIEW OF SYSTEMS  Review of  Systems    All systems reviewed and negative except for those discussed in HPI.     PHYSICAL EXAM    I have reviewed the triage vital signs and nursing notes.  Vitals:    07/01/24 0949   BP: 144/90   Pulse: 85   Resp: 18   Temp: 98.4 °F (36.9 °C)   SpO2: 94%     Physical Exam  Vital signs: Reviewed in nurses notes    General: Patient is awake alert no acute distress    HEENT: Normocephalic atraumatic nasopharynx is slightly congested.  Oropharynx is clear and moist.    Neck:   Supple without lymphadenopathy    Respiratory:   Nonlabored respirations.  Clear to auscultation bilaterally.  Equal breath sounds bilaterally.  No wheezes or stridor noted.    Cardiovascular: Regular rate and rhythm.  No murmur.  Equal pulses in bilateral lower extremities without edema.    Abdomen: Nondistended    Skin:   Warm and dry.  No rashes noted    Neurological examination: Patient is awake alert oriented x4.  Speech is normal.  No facial palsy.  No focal motor or sensory deficits.    LAB RESULTS  No results found for this or any previous visit (from the past 24 hour(s)).    Ordered the above labs and independently interpreted results.  My findings will be discussed in the ED course or medical decision making section below      PROCEDURES:  Procedures      RADIOLOGY RESULTS  XR Chest 1 View    Result Date: 7/1/2024  XR CHEST 1 VW-  HISTORY: Male who is 74 years-old, cough  TECHNIQUE: Frontal view of the chest  COMPARISON: 6/7/2024  FINDINGS: The heart size is normal. Aorta is tortuous. Pulmonary vasculature is unremarkable. No focal pulmonary consolidation, pleural effusion, or pneumothorax. No acute osseous process.      No focal pulmonary consolidation. Tortuous aorta. Follow-up as clinically indicated.    This report was finalized on 7/1/2024 10:05 AM by Dr. Satnam Nevarez M.D on Workstation: [id number]        Ordered the above noted radiological studies.  Independently interpreted by me.  My findings will be discussed in the  medical decision section below.     PROGRESS, DATA ANALYSIS, CONSULTS AND MEDICAL DECISION MAKING    Please note that this section constitutes my independent interpretation of clinical data including lab results, radiology, EKG's.  This constitutes my independent professional opinion regarding differential diagnosis and management of this patient.  It may include any factors such as history from outside sources, review of external records, social determinants of health, management of medications, response to those treatments, and discussions with other providers.       Orders placed during this visit:  Orders Placed This Encounter   Procedures    XR Chest 1 View       Medications   azithromycin (ZITHROMAX) tablet 500 mg (has no administration in time range)   predniSONE (DELTASONE) tablet 60 mg (has no administration in time range)            Medical Decision Making  Problems Addressed:  Upper respiratory tract infection, unspecified type: complicated acute illness or injury    Amount and/or Complexity of Data Reviewed  Radiology: ordered.    Risk  Prescription drug management.            DIAGNOSIS  Final diagnoses:   Upper respiratory tract infection, unspecified type          Medication List        New Prescriptions      azithromycin 250 MG tablet  Commonly known as: ZITHROMAX  1 po daily x 4 days.     predniSONE 20 MG tablet  Commonly known as: DELTASONE  3 po daily x 1d, then 2 po daily x 2d, then 1 po daily x 2d.     promethazine-dextromethorphan 6.25-15 MG/5ML syrup  Commonly known as: PROMETHAZINE-DM  Take 5 mL by mouth 4 (Four) Times a Day As Needed for Cough for up to 6 days.               Where to Get Your Medications        These medications were sent to [facility] - [place], [place] - [address] - [phone]  - [phone]   [address] [address]      Phone: [phone]   azithromycin 250 MG tablet  predniSONE 20 MG tablet  promethazine-dextromethorphan  6.25-15 MG/5ML syrup         FOLLOW-UP  Trinity Ac, APRN  [address]  [place] [address]  [phone]    In 1 week          Latest Documented Vital Signs:  As of 10:18 EDT  BP- 144/90 HR- 85 Temp- 98.4 °F (36.9 °C) (Oral) O2 sat- 94%    Appropriate PPE utilized throughout this patient encounter to include mask, if indicated, per current protocol. Hand hygiene was performed before donning PPE and after removal when leaving the room.    Please note that portions of this were completed with a voice recognition program.     Note Disclaimer: At [facility], we believe that sharing information builds trust and better relationships. You are receiving this note because you are receiving care at [facility] or recently visited. It is possible you will see health information before a provider has talked with you about it. This kind of information can be easy to misunderstand. To help you fully understand what it means for your health, we urge you to discuss this note with your provider.

## 2024-07-01 NOTE — DISCHARGE INSTRUCTIONS
Today your lungs sound clear and I do not detect any evidence of a pneumonia on your chest x-ray.    I am going to place you on a treatment course of azithromycin, a prednisone taper, and some appropriate cough medication.    You did receive your first dose of both the prednisone and the azithromycin here this morning.  You will not need another dose of each of those 2 medications until tomorrow.    Return anytime for increasing cough, shortness of breath, other difficulties    Please read all of the instructions in this handout.  If you receive prescriptions please fill them and take them as directed.  Please call your primary care physician for follow-up appointment in the next 5 to 7 days.  If you do not have a physician you may call the [facility] referral line at [phone].    You may return to the emergency department at any time for any concerns such as worsening symptoms.  If you received a work or school note it will be printed at the back of this packet.

## 2025-01-04 ENCOUNTER — HOSPITAL ENCOUNTER (OUTPATIENT)
Facility: HOSPITAL | Age: 75
Discharge: HOME | End: 2025-01-04
Attending: EMERGENCY MEDICINE
Payer: MEDICARE

## 2025-01-04 ENCOUNTER — APPOINTMENT (OUTPATIENT)
Dept: GENERAL RADIOLOGY | Facility: HOSPITAL | Age: 75
End: 2025-01-04
Payer: MEDICARE

## 2025-01-04 VITALS
OXYGEN SATURATION: 93 % | TEMPERATURE: 98.6 F | RESPIRATION RATE: 18 BRPM | DIASTOLIC BLOOD PRESSURE: 87 MMHG | HEART RATE: 66 BPM | WEIGHT: 220 LBS | BODY MASS INDEX: 31.5 KG/M2 | HEIGHT: 70 IN | SYSTOLIC BLOOD PRESSURE: 170 MMHG

## 2025-01-04 DIAGNOSIS — M70.22 OLECRANON BURSITIS OF LEFT ELBOW: Primary | ICD-10-CM

## 2025-01-04 PROCEDURE — 73080 X-RAY EXAM OF ELBOW: CPT

## 2025-01-04 PROCEDURE — G0463 HOSPITAL OUTPT CLINIC VISIT: HCPCS | Performed by: EMERGENCY MEDICINE

## 2025-01-04 NOTE — FSED PROVIDER NOTE
Subjective   History of Present Illness  75yo male pmh significant hyperlipidemia/prostate ca presents ED c/o 1d hx nontraumatic left elbow swelling x1d duration.  ROS neg fever/chills/trauma/pain/redness.    History provided by:  Patient  Joint Swelling      Review of Systems   Constitutional: Negative.    HENT: Negative.     Eyes: Negative.    Respiratory: Negative.     Cardiovascular: Negative.    Gastrointestinal: Negative.    Musculoskeletal:  Positive for joint swelling. Negative for arthralgias.   All other systems reviewed and are negative.      Past Medical History:   Diagnosis Date    Depression     Frequent urination at night     Hyperlipidemia     Kidney stone     Palpitations        Allergies   Allergen Reactions    Iodine     Penicillins     Shellfish-Derived Products Swelling       Past Surgical History:   Procedure Laterality Date    APPENDECTOMY      CATARACT EXTRACTION         Family History   Problem Relation Age of Onset    Hearing loss Father     Prostate cancer Father     Heart attack Brother     Drug abuse Son     Brain cancer Brother     Lung cancer Brother        Social History     Socioeconomic History    Marital status: Single   Tobacco Use    Smoking status: Never    Smokeless tobacco: Never   Substance and Sexual Activity    Alcohol use: No    Drug use: No           Objective   Physical Exam  Vitals and nursing note reviewed.   Constitutional:       Appearance: Normal appearance.   HENT:      Head: Normocephalic and atraumatic.      Right Ear: External ear normal.      Left Ear: External ear normal.      Nose: Nose normal.      Mouth/Throat:      Mouth: Mucous membranes are moist.      Pharynx: Oropharynx is clear. No oropharyngeal exudate or posterior oropharyngeal erythema.   Eyes:      Pupils: Pupils are equal, round, and reactive to light.   Cardiovascular:      Rate and Rhythm: Normal rate and regular rhythm.      Pulses: Normal pulses.      Heart sounds: Normal heart sounds. No  murmur heard.     No friction rub. No gallop.   Pulmonary:      Effort: Pulmonary effort is normal.      Breath sounds: Normal breath sounds. No wheezing, rhonchi or rales.   Abdominal:      General: Abdomen is flat. Bowel sounds are normal. There is no distension.      Palpations: Abdomen is soft.      Tenderness: There is no abdominal tenderness.   Musculoskeletal:         General: Swelling present. No tenderness, deformity or signs of injury.        Arms:       Cervical back: Normal range of motion and neck supple. No rigidity.   Lymphadenopathy:      Cervical: No cervical adenopathy.   Skin:     General: Skin is warm and dry.   Neurological:      General: No focal deficit present.      Mental Status: He is alert and oriented to person, place, and time.         Procedures           ED Course  ED Course as of 01/04/25 1705   Sat Jan 04, 2025   1536 Left elbow XR: neg fracture/dislocation (prelim) [SD]      ED Course User Index  [SD] Yaakov Baez MD      XR Elbow 3+ View Left    Result Date: 1/4/2025  Narrative: XR ELBOW 3+ VW LEFT-  DATE OF EXAM: 1/4/2025 2:42 PM  INDICATION: Pain and swelling since waking up this morning. No known injury.  COMPARISON: None available.  TECHNIQUE: 3 views of the elbow were obtained.  FINDINGS: No evidence of acute fracture or dislocation. The joint spaces are well-maintained with no significant joint effusion. Diffuse soft tissue swelling, greatest posteriorly.      Impression: Soft tissue swelling, without radiographic evidence of acute fracture or dislocation.  This report was finalized on 1/4/2025 3:37 PM by Niles Cristina MD on Workstation: [id number]                                          Medical Decision Making      Final diagnoses:   Olecranon bursitis of left elbow       ED Disposition  ED Disposition       ED Disposition   Discharge    Condition   Good    Comment   --               Trinity Ac, APRN  [address]  [address]  [address] [phone]    Schedule an appointment as soon as possible for a visit       Rubio Morton MD  [address] [facility] [address]  [place] [address]  [phone]    In 3 days           Medication List        New Prescriptions      diclofenac 50 MG EC tablet  Commonly known as: VOLTAREN  Take 1 tablet by mouth 3 (Three) Times a Day As Needed (pain or swelling).            Stop      azithromycin 250 MG tablet  Commonly known as: ZITHROMAX     predniSONE 20 MG tablet  Commonly known as: DELTASONE               Where to Get Your Medications        These medications were sent to [id number] - [place], [place] - [address] - [phone]  - [phone] FX  [address] [facility] [address]      Phone: [phone]   diclofenac 50 MG EC tablet

## 2025-01-04 NOTE — DISCHARGE INSTRUCTIONS
Wear ace wrap/compressive sleeve as directed left elbow  Return ED fever, increased swelling, pain, redness, worse condition, any other concerns

## 2025-05-17 ENCOUNTER — HOSPITAL ENCOUNTER (OUTPATIENT)
Facility: HOSPITAL | Age: 75
Discharge: HOME | End: 2025-05-17
Attending: EMERGENCY MEDICINE
Payer: MEDICARE

## 2025-05-17 VITALS
BODY MASS INDEX: 31.5 KG/M2 | OXYGEN SATURATION: 100 % | HEIGHT: 70 IN | WEIGHT: 220 LBS | TEMPERATURE: 98.2 F | RESPIRATION RATE: 12 BRPM | DIASTOLIC BLOOD PRESSURE: 85 MMHG | HEART RATE: 85 BPM | SYSTOLIC BLOOD PRESSURE: 135 MMHG

## 2025-05-17 DIAGNOSIS — J06.9 VIRAL UPPER RESPIRATORY TRACT INFECTION: Primary | ICD-10-CM

## 2025-05-17 PROCEDURE — G0463 HOSPITAL OUTPT CLINIC VISIT: HCPCS | Performed by: EMERGENCY MEDICINE

## 2025-05-17 PROCEDURE — 87636 SARSCOV2 & INF A&B AMP PRB: CPT | Performed by: EMERGENCY MEDICINE

## 2025-05-17 PROCEDURE — 87651 STREP A DNA AMP PROBE: CPT | Performed by: EMERGENCY MEDICINE

## 2025-05-17 RX ORDER — DEXTROMETHORPHAN POLISTIREX 30 MG/5ML
60 SUSPENSION ORAL 2 TIMES DAILY PRN
Qty: 280 ML | Refills: 0 | Status: SHIPPED | OUTPATIENT
Start: 2025-05-17

## 2025-05-17 RX ORDER — CETIRIZINE HYDROCHLORIDE, PSEUDOEPHEDRINE HYDROCHLORIDE 5; 120 MG/1; MG/1
1 TABLET, FILM COATED, EXTENDED RELEASE ORAL DAILY PRN
Qty: 7 TABLET | Refills: 0 | Status: SHIPPED | OUTPATIENT
Start: 2025-05-17

## 2025-05-17 NOTE — FSED PROVIDER NOTE
Subjective   History of Present Illness  73yo male pmh significant htn/hyperlipidemia/prostate ca presents ED c/o 2d hx productive cough/congestion/rhinorrhea/sore throat.    History provided by:  Patient  URI  Presenting symptoms: congestion, cough, rhinorrhea and sore throat    Presenting symptoms: no ear pain and no fever        Review of Systems   Constitutional:  Negative for fever.   HENT:  Positive for congestion, rhinorrhea and sore throat. Negative for ear pain.    Eyes: Negative.    Respiratory:  Positive for cough. Negative for shortness of breath.    Cardiovascular: Negative.    Gastrointestinal: Negative.    All other systems reviewed and are negative.      Past Medical History:   Diagnosis Date    Depression     Frequent urination at night     Hyperlipidemia     Kidney stone     Palpitations        Allergies   Allergen Reactions    Iodine     Penicillins     Shellfish-Derived Products Swelling       Past Surgical History:   Procedure Laterality Date    APPENDECTOMY      CATARACT EXTRACTION         Family History   Problem Relation Age of Onset    Hearing loss Father     Prostate cancer Father     Heart attack Brother     Drug abuse Son     Brain cancer Brother     Lung cancer Brother        Social History     Socioeconomic History    Marital status: Single   Tobacco Use    Smoking status: Never    Smokeless tobacco: Never   Substance and Sexual Activity    Alcohol use: No    Drug use: No           Objective   Physical Exam  Vitals and nursing note reviewed.   Constitutional:       Appearance: Normal appearance.   HENT:      Head: Normocephalic and atraumatic.      Right Ear: Tympanic membrane, ear canal and external ear normal.      Left Ear: Tympanic membrane, ear canal and external ear normal.      Nose: Nose normal. No rhinorrhea.      Mouth/Throat:      Mouth: Mucous membranes are moist.      Pharynx: Oropharynx is clear. Uvula midline. No pharyngeal swelling, oropharyngeal exudate, posterior  oropharyngeal erythema, uvula swelling or postnasal drip.      Tonsils: No tonsillar exudate or tonsillar abscesses.   Eyes:      Conjunctiva/sclera: Conjunctivae normal.      Pupils: Pupils are equal, round, and reactive to light.   Cardiovascular:      Rate and Rhythm: Normal rate and regular rhythm.      Pulses: Normal pulses.      Heart sounds: Normal heart sounds. No murmur heard.     No friction rub. No gallop.   Pulmonary:      Effort: Pulmonary effort is normal. No respiratory distress.      Breath sounds: Normal breath sounds. No wheezing, rhonchi or rales.   Abdominal:      General: Abdomen is flat. Bowel sounds are normal. There is no distension.      Palpations: Abdomen is soft.      Tenderness: There is no abdominal tenderness.   Musculoskeletal:         General: No swelling or deformity. Normal range of motion.      Cervical back: Normal range of motion and neck supple. No rigidity.   Lymphadenopathy:      Cervical: No cervical adenopathy.   Skin:     General: Skin is warm and dry.   Neurological:      General: No focal deficit present.      Mental Status: He is alert and oriented to person, place, and time.      GCS: GCS eye subscore is 4. GCS verbal subscore is 5. GCS motor subscore is 6.         Procedures           ED Course      Labs Reviewed   COVID-19 AND FLU A/B, NP SWAB IN TRANSPORT MEDIA 1 HR TAT - Normal    Narrative:     Fact sheet for providers: https://www.fda.gov/media/198232/download    Fact sheet for patients: https://www.fda.gov/media/671016/download    Test performed by PCR.   RAPID STREP A SCREEN - Normal                                          Medical Decision Making  Problems Addressed:  Viral upper respiratory tract infection: acute illness or injury    Risk  OTC drugs.        Final diagnoses:   Viral upper respiratory tract infection       ED Disposition  ED Disposition       ED Disposition   Discharge    Condition   Good    Comment   --               Trinity Ac,  APRN  [address]  [place] [id number]  [phone]    In 2 days           Medication List        New Prescriptions      cetirizine-pseudoephedrine 5-120 MG per 12 hr tablet  Commonly known as: ZyrTEC-D  Take 1 tablet by mouth Daily As Needed for Rhinitis.     dextromethorphan polistirex ER 30 MG/5ML Suspension Extended Release oral suspension  Commonly known as: Delsym  Take 10 mL by mouth 2 (Two) Times a Day As Needed (cough).               Where to Get Your Medications        These medications were sent to [id number] - [place], [place] - [address] - [phone]  - [phone]   [address] [place] [id number]      Phone: [phone]   cetirizine-pseudoephedrine 5-120 MG per 12 hr tablet  dextromethorphan polistirex ER 30 MG/5ML Suspension Extended Release oral suspension